# Patient Record
Sex: FEMALE
[De-identification: names, ages, dates, MRNs, and addresses within clinical notes are randomized per-mention and may not be internally consistent; named-entity substitution may affect disease eponyms.]

---
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
## 2024-05-15 ENCOUNTER — APPOINTMENT (OUTPATIENT)
Dept: ORTHOPEDIC SURGERY | Facility: CLINIC | Age: 19
End: 2024-05-15

## 2024-05-27 ENCOUNTER — RX RENEWAL (OUTPATIENT)
Age: 19
End: 2024-05-27

## 2024-06-03 ENCOUNTER — NON-APPOINTMENT (OUTPATIENT)
Age: 19
End: 2024-06-03

## 2024-06-21 ENCOUNTER — APPOINTMENT (OUTPATIENT)
Dept: OBGYN | Facility: CLINIC | Age: 19
End: 2024-06-21
Payer: COMMERCIAL

## 2024-06-21 VITALS
SYSTOLIC BLOOD PRESSURE: 118 MMHG | WEIGHT: 137 LBS | BODY MASS INDEX: 24.27 KG/M2 | DIASTOLIC BLOOD PRESSURE: 72 MMHG | HEIGHT: 63 IN

## 2024-06-21 DIAGNOSIS — Z01.419 ENCOUNTER FOR GYNECOLOGICAL EXAMINATION (GENERAL) (ROUTINE) W/OUT ABNORMAL FINDINGS: ICD-10-CM

## 2024-06-21 DIAGNOSIS — Z30.9 ENCOUNTER FOR CONTRACEPTIVE MANAGEMENT, UNSPECIFIED: ICD-10-CM

## 2024-06-21 PROCEDURE — 99395 PREV VISIT EST AGE 18-39: CPT

## 2024-06-21 PROCEDURE — 99459 PELVIC EXAMINATION: CPT

## 2024-06-21 RX ORDER — NORGESTIMATE AND ETHINYL ESTRADIOL 0.25-0.035
0.25-35 KIT ORAL
Qty: 3 | Refills: 3 | Status: ACTIVE | COMMUNITY
Start: 2024-05-27 | End: 1900-01-01

## 2024-06-21 RX ORDER — NORGESTIMATE AND ETHINYL ESTRADIOL 0.25-0.035
0.25-35 KIT ORAL DAILY
Qty: 1 | Refills: 6 | Status: DISCONTINUED | COMMUNITY
Start: 2023-05-02 | End: 2024-06-21

## 2024-06-21 RX ORDER — NORGESTIMATE AND ETHINYL ESTRADIOL 0.25-0.035
0.25-35 KIT ORAL DAILY
Qty: 90 | Refills: 3 | Status: DISCONTINUED | COMMUNITY
Start: 2023-06-12 | End: 2024-06-21

## 2024-06-21 RX ORDER — DOXYCYCLINE HYCLATE 100 MG/1
100 TABLET ORAL
Qty: 14 | Refills: 0 | Status: DISCONTINUED | COMMUNITY
Start: 2023-05-08 | End: 2024-06-21

## 2024-06-21 NOTE — PLAN
[FreeTextEntry1] : Chlamydia/GC testing performed. Will follow up results.   Prescription for birth control pills were electronically sent to the pharmacy. She has no contraindications to birth control pill use. Instructions on pill use, precautions, and side effects were discussed in detail. She is aware that the birth control pill is not perfect for preventing pregnancy even if she is compliant with taking the pill and therefore, she needs condoms for back up every time she has sex. She was also made aware that the birth control pill does not protect her against sexual transmitted diseases, and she still requires condom use. Questions answered.

## 2024-06-21 NOTE — HISTORY OF PRESENT ILLNESS
[FreeTextEntry1] : 18-year-old  female P0 presents for well woman exam.  She is currently using Mary with good effect. She reports cramping 3-4 days prior to her menses. She denies any breakthrough bleeding.  She is currently sexually active and uses condoms sometimes. She was treated for chlamydia in May 2023.   Denies any GYN complaints. [Gonorrhea test offered] : Gonorrhea test offered [Chlamydia test offered] : Chlamydia test offered [Oral Contraceptive] : uses oral contraception pills [Y] : Patient is sexually active [GonorrheaDate] : 06/23 [ChlamydiaDate] : 06/23 [LMPDate] : 06/01/24 [MensesFreq] : 28 [PGHxTotal] : 0

## 2024-06-21 NOTE — PHYSICAL EXAM
[Chaperone Present] : A chaperone was present in the examining room during all aspects of the physical examination [33107] : A chaperone was present during the pelvic exam. [FreeTextEntry2] : Ledy [Appropriately responsive] : appropriately responsive [Alert] : alert [No Acute Distress] : no acute distress [No Lymphadenopathy] : no lymphadenopathy [Regular Rate Rhythm] : regular rate rhythm [No Murmurs] : no murmurs [Clear to Auscultation B/L] : clear to auscultation bilaterally [Soft] : soft [Non-tender] : non-tender [Non-distended] : non-distended [No HSM] : No HSM [No Lesions] : no lesions [No Mass] : no mass [Oriented x3] : oriented x3 [Examination Of The Breasts] : a normal appearance [No Masses] : no breast masses were palpable [Labia Majora] : normal [Labia Minora] : normal [Normal] : normal [Uterine Adnexae] : normal

## 2025-06-19 ENCOUNTER — RX RENEWAL (OUTPATIENT)
Age: 20
End: 2025-06-19

## 2025-06-24 ENCOUNTER — TRANSCRIPTION ENCOUNTER (OUTPATIENT)
Age: 20
End: 2025-06-24

## 2025-06-24 ENCOUNTER — APPOINTMENT (OUTPATIENT)
Dept: OBGYN | Facility: CLINIC | Age: 20
End: 2025-06-24
Payer: COMMERCIAL

## 2025-06-24 VITALS
WEIGHT: 147 LBS | SYSTOLIC BLOOD PRESSURE: 124 MMHG | BODY MASS INDEX: 26.05 KG/M2 | HEART RATE: 79 BPM | HEIGHT: 63 IN | DIASTOLIC BLOOD PRESSURE: 79 MMHG

## 2025-06-24 PROBLEM — Z01.419 VISIT FOR GYNECOLOGIC EXAMINATION: Status: ACTIVE | Noted: 2025-06-24

## 2025-06-24 PROBLEM — Z11.3 SCREEN FOR STD (SEXUALLY TRANSMITTED DISEASE): Status: ACTIVE | Noted: 2025-06-24

## 2025-06-24 PROBLEM — N93.8 DUB (DYSFUNCTIONAL UTERINE BLEEDING): Status: ACTIVE | Noted: 2025-06-24

## 2025-06-24 PROCEDURE — 99395 PREV VISIT EST AGE 18-39: CPT

## 2025-08-14 ENCOUNTER — ASOB RESULT (OUTPATIENT)
Age: 20
End: 2025-08-14

## 2025-08-14 ENCOUNTER — APPOINTMENT (OUTPATIENT)
Dept: OBGYN | Facility: CLINIC | Age: 20
End: 2025-08-14
Payer: COMMERCIAL

## 2025-08-14 ENCOUNTER — APPOINTMENT (OUTPATIENT)
Dept: ANTEPARTUM | Facility: CLINIC | Age: 20
End: 2025-08-14

## 2025-08-14 VITALS
WEIGHT: 146 LBS | DIASTOLIC BLOOD PRESSURE: 77 MMHG | HEIGHT: 63 IN | HEART RATE: 83 BPM | SYSTOLIC BLOOD PRESSURE: 120 MMHG | BODY MASS INDEX: 25.87 KG/M2

## 2025-08-14 DIAGNOSIS — N92.0 EXCESSIVE AND FREQUENT MENSTRUATION WITH REGULAR CYCLE: ICD-10-CM

## 2025-08-14 PROCEDURE — 76830 TRANSVAGINAL US NON-OB: CPT | Mod: 59

## 2025-08-14 PROCEDURE — 99213 OFFICE O/P EST LOW 20 MIN: CPT

## 2025-08-14 PROCEDURE — 76856 US EXAM PELVIC COMPLETE: CPT
